# Patient Record
Sex: MALE | Race: WHITE
[De-identification: names, ages, dates, MRNs, and addresses within clinical notes are randomized per-mention and may not be internally consistent; named-entity substitution may affect disease eponyms.]

---

## 2021-06-24 ENCOUNTER — HOSPITAL ENCOUNTER (EMERGENCY)
Dept: HOSPITAL 8 - ED | Age: 61
Discharge: HOME | End: 2021-06-24
Payer: COMMERCIAL

## 2021-06-24 VITALS — DIASTOLIC BLOOD PRESSURE: 99 MMHG | SYSTOLIC BLOOD PRESSURE: 164 MMHG

## 2021-06-24 VITALS — WEIGHT: 218.26 LBS | BODY MASS INDEX: 32.33 KG/M2 | HEIGHT: 69 IN

## 2021-06-24 DIAGNOSIS — E11.9: ICD-10-CM

## 2021-06-24 DIAGNOSIS — H54.61: Primary | ICD-10-CM

## 2021-06-24 LAB
ALBUMIN SERPL-MCNC: 4.1 G/DL (ref 3.4–5)
ANION GAP SERPL CALC-SCNC: 6 MMOL/L (ref 5–15)
BASOPHILS # BLD AUTO: 0.1 X10^3/UL (ref 0–0.1)
BASOPHILS NFR BLD AUTO: 1 % (ref 0–1)
CALCIUM SERPL-MCNC: 9 MG/DL (ref 8.5–10.1)
CHLORIDE SERPL-SCNC: 106 MMOL/L (ref 98–107)
CREAT SERPL-MCNC: 0.8 MG/DL (ref 0.7–1.3)
EOSINOPHIL # BLD AUTO: 0.2 X10^3/UL (ref 0–0.4)
EOSINOPHIL NFR BLD AUTO: 2 % (ref 1–7)
ERYTHROCYTE [DISTWIDTH] IN BLOOD BY AUTOMATED COUNT: 13.5 % (ref 9.4–14.8)
ERYTHROCYTE [SEDIMENTATION RATE] IN BLOOD BY WESTERGREN METHOD: 3 MM/HR (ref 0–10)
GLUCOSE CSF-MCNC: 89 MG/DL (ref 40–80)
HCT (SEDRATE): 48.4 % (ref 39.2–51.8)
LYMPHOCYTES # BLD AUTO: 0.8 X10^3/UL (ref 1–3.4)
LYMPHOCYTES NFR BLD AUTO: 7 % (ref 22–44)
MCH RBC QN AUTO: 32 PG (ref 27.5–34.5)
MCHC RBC AUTO-ENTMCNC: 34.9 G/DL (ref 33.2–36.2)
MONOCYTES # BLD AUTO: 0.5 X10^3/UL (ref 0.2–0.8)
MONOCYTES NFR BLD AUTO: 4 % (ref 2–9)
NEUTROPHILS # BLD AUTO: 9.4 X10^3/UL (ref 1.8–6.8)
NEUTROPHILS NFR BLD AUTO: 87 % (ref 42–75)
PLATELET # BLD AUTO: 264 X10^3/UL (ref 130–400)
PMV BLD AUTO: 7.6 FL (ref 7.4–10.4)
RBC # BLD AUTO: 5.27 X10^6/UL (ref 4.38–5.82)
TOTAL PROTEIN,CSF: 54 MG/DL (ref 15–45)

## 2021-06-24 PROCEDURE — 84157 ASSAY OF PROTEIN OTHER: CPT

## 2021-06-24 PROCEDURE — 85549 MURAMIDASE: CPT

## 2021-06-24 PROCEDURE — 96361 HYDRATE IV INFUSION ADD-ON: CPT

## 2021-06-24 PROCEDURE — 87252 VIRUS INOCULATION TISSUE: CPT

## 2021-06-24 PROCEDURE — 96374 THER/PROPH/DIAG INJ IV PUSH: CPT

## 2021-06-24 PROCEDURE — 87806 HIV AG W/HIV1&2 ANTB W/OPTIC: CPT

## 2021-06-24 PROCEDURE — 36415 COLL VENOUS BLD VENIPUNCTURE: CPT

## 2021-06-24 PROCEDURE — 82945 GLUCOSE OTHER FLUID: CPT

## 2021-06-24 PROCEDURE — 87070 CULTURE OTHR SPECIMN AEROBIC: CPT

## 2021-06-24 PROCEDURE — 85651 RBC SED RATE NONAUTOMATED: CPT

## 2021-06-24 PROCEDURE — 85025 COMPLETE CBC W/AUTO DIFF WBC: CPT

## 2021-06-24 PROCEDURE — 86592 SYPHILIS TEST NON-TREP QUAL: CPT

## 2021-06-24 PROCEDURE — 82164 ANGIOTENSIN I ENZYME TEST: CPT

## 2021-06-24 PROCEDURE — 82040 ASSAY OF SERUM ALBUMIN: CPT

## 2021-06-24 PROCEDURE — 87205 SMEAR GRAM STAIN: CPT

## 2021-06-24 PROCEDURE — 99285 EMERGENCY DEPT VISIT HI MDM: CPT

## 2021-06-24 PROCEDURE — 62328 DX LMBR SPI PNXR W/FLUOR/CT: CPT

## 2021-06-24 PROCEDURE — 89051 BODY FLUID CELL COUNT: CPT

## 2021-06-24 PROCEDURE — 80048 BASIC METABOLIC PNL TOTAL CA: CPT

## 2021-06-24 PROCEDURE — 88108 CYTOPATH CONCENTRATE TECH: CPT

## 2021-06-24 PROCEDURE — G0475 HIV COMBINATION ASSAY: HCPCS

## 2021-06-24 PROCEDURE — 86480 TB TEST CELL IMMUN MEASURE: CPT

## 2021-06-24 NOTE — NUR
SECOND LUMBAR PUNCTURE COMPLETED, PATIENT LAYING FLAT IN GURNEY, DENIES PAIN, 
CONNECTED TO MONITOR, VSS, CALL LIGHT WITHIN REACH, NO FURTHER NEEDS AT THIS 
TIME.

## 2021-06-24 NOTE — NUR
PATIENT RESTING IN GURNEY, WATCHING TV, NADN, CONNECTED TO MONITOR, VSS, CALL 
LIGHT WITHIN REACH, NO FURTHER NEEDS AT THIS TIME.



WAITING FOR MRI.